# Patient Record
Sex: MALE | Race: OTHER | Employment: FULL TIME | URBAN - METROPOLITAN AREA
[De-identification: names, ages, dates, MRNs, and addresses within clinical notes are randomized per-mention and may not be internally consistent; named-entity substitution may affect disease eponyms.]

---

## 2019-04-17 ENCOUNTER — APPOINTMENT (OUTPATIENT)
Dept: GENERAL RADIOLOGY | Age: 19
End: 2019-04-17
Payer: COMMERCIAL

## 2019-04-17 ENCOUNTER — HOSPITAL ENCOUNTER (EMERGENCY)
Age: 19
Discharge: HOME | End: 2019-04-17
Attending: EMERGENCY MEDICINE
Payer: COMMERCIAL

## 2019-04-17 VITALS
WEIGHT: 160 LBS | SYSTOLIC BLOOD PRESSURE: 136 MMHG | DIASTOLIC BLOOD PRESSURE: 73 MMHG | TEMPERATURE: 99.7 F | OXYGEN SATURATION: 96 % | HEART RATE: 85 BPM | RESPIRATION RATE: 16 BRPM | BODY MASS INDEX: 23.7 KG/M2 | HEIGHT: 69 IN

## 2019-04-17 DIAGNOSIS — R52 BODY ACHES: ICD-10-CM

## 2019-04-17 DIAGNOSIS — R11.2 NON-INTRACTABLE VOMITING WITH NAUSEA, UNSPECIFIED VOMITING TYPE: Primary | ICD-10-CM

## 2019-04-17 LAB
A/G RATIO: 1.3 (ref 1.1–2.2)
ALBUMIN SERPL-MCNC: 4.2 G/DL (ref 3.4–5)
ALP BLD-CCNC: 91 U/L (ref 40–129)
ALT SERPL-CCNC: 11 U/L (ref 10–40)
ANION GAP SERPL CALCULATED.3IONS-SCNC: 14 MMOL/L (ref 3–16)
AST SERPL-CCNC: 16 U/L (ref 15–37)
BASOPHILS ABSOLUTE: 0 K/UL (ref 0–0.2)
BASOPHILS RELATIVE PERCENT: 0.2 %
BILIRUB SERPL-MCNC: 0.5 MG/DL (ref 0–1)
BUN BLDV-MCNC: 11 MG/DL (ref 7–20)
CALCIUM SERPL-MCNC: 9 MG/DL (ref 8.3–10.6)
CHLORIDE BLD-SCNC: 97 MMOL/L (ref 99–110)
CO2: 24 MMOL/L (ref 21–32)
CREAT SERPL-MCNC: 1.1 MG/DL (ref 0.9–1.3)
EOSINOPHILS ABSOLUTE: 0 K/UL (ref 0–0.6)
EOSINOPHILS RELATIVE PERCENT: 0.2 %
GFR AFRICAN AMERICAN: >60
GFR NON-AFRICAN AMERICAN: >60
GLOBULIN: 3.2 G/DL
GLUCOSE BLD-MCNC: 100 MG/DL (ref 70–99)
HCT VFR BLD CALC: 42 % (ref 40.5–52.5)
HEMOGLOBIN: 14 G/DL (ref 13.5–17.5)
LIPASE: 13 U/L (ref 13–60)
LYMPHOCYTES ABSOLUTE: 1.1 K/UL (ref 1–5.1)
LYMPHOCYTES RELATIVE PERCENT: 14.8 %
MCH RBC QN AUTO: 28.2 PG (ref 26–34)
MCHC RBC AUTO-ENTMCNC: 33.3 G/DL (ref 31–36)
MCV RBC AUTO: 84.6 FL (ref 80–100)
MONOCYTES ABSOLUTE: 0.8 K/UL (ref 0–1.3)
MONOCYTES RELATIVE PERCENT: 10.1 %
NEUTROPHILS ABSOLUTE: 5.7 K/UL (ref 1.7–7.7)
NEUTROPHILS RELATIVE PERCENT: 74.7 %
PDW BLD-RTO: 13.4 % (ref 12.4–15.4)
PLATELET # BLD: 191 K/UL (ref 135–450)
PMV BLD AUTO: 10.1 FL (ref 5–10.5)
POTASSIUM SERPL-SCNC: 3.3 MMOL/L (ref 3.5–5.1)
RBC # BLD: 4.96 M/UL (ref 4.2–5.9)
SODIUM BLD-SCNC: 135 MMOL/L (ref 136–145)
TOTAL PROTEIN: 7.4 G/DL (ref 6.4–8.2)
WBC # BLD: 7.7 K/UL (ref 4–11)

## 2019-04-17 PROCEDURE — 99284 EMERGENCY DEPT VISIT MOD MDM: CPT

## 2019-04-17 PROCEDURE — 96374 THER/PROPH/DIAG INJ IV PUSH: CPT

## 2019-04-17 PROCEDURE — 85025 COMPLETE CBC W/AUTO DIFF WBC: CPT

## 2019-04-17 PROCEDURE — 2580000003 HC RX 258: Performed by: EMERGENCY MEDICINE

## 2019-04-17 PROCEDURE — 6360000002 HC RX W HCPCS: Performed by: EMERGENCY MEDICINE

## 2019-04-17 PROCEDURE — 71045 X-RAY EXAM CHEST 1 VIEW: CPT

## 2019-04-17 PROCEDURE — 80053 COMPREHEN METABOLIC PANEL: CPT

## 2019-04-17 PROCEDURE — 6370000000 HC RX 637 (ALT 250 FOR IP): Performed by: EMERGENCY MEDICINE

## 2019-04-17 PROCEDURE — 83690 ASSAY OF LIPASE: CPT

## 2019-04-17 RX ORDER — FAMOTIDINE 20 MG/1
20 TABLET, FILM COATED ORAL 2 TIMES DAILY
Qty: 10 TABLET | Refills: 0 | Status: SHIPPED | OUTPATIENT
Start: 2019-04-17 | End: 2019-04-22

## 2019-04-17 RX ORDER — ONDANSETRON 4 MG/1
4 TABLET, ORALLY DISINTEGRATING ORAL EVERY 4 HOURS PRN
Qty: 10 TABLET | Refills: 0 | Status: SHIPPED | OUTPATIENT
Start: 2019-04-17

## 2019-04-17 RX ORDER — ONDANSETRON 2 MG/ML
4 INJECTION INTRAMUSCULAR; INTRAVENOUS ONCE
Status: COMPLETED | OUTPATIENT
Start: 2019-04-17 | End: 2019-04-17

## 2019-04-17 RX ORDER — FAMOTIDINE 20 MG/1
20 TABLET, FILM COATED ORAL ONCE
Status: COMPLETED | OUTPATIENT
Start: 2019-04-17 | End: 2019-04-17

## 2019-04-17 RX ORDER — 0.9 % SODIUM CHLORIDE 0.9 %
1000 INTRAVENOUS SOLUTION INTRAVENOUS ONCE
Status: COMPLETED | OUTPATIENT
Start: 2019-04-17 | End: 2019-04-17

## 2019-04-17 RX ADMIN — ONDANSETRON 4 MG: 2 INJECTION INTRAMUSCULAR; INTRAVENOUS at 06:16

## 2019-04-17 RX ADMIN — FAMOTIDINE 20 MG: 20 TABLET ORAL at 06:47

## 2019-04-17 RX ADMIN — SODIUM CHLORIDE 1000 ML: 9 INJECTION, SOLUTION INTRAVENOUS at 06:14

## 2019-04-17 ASSESSMENT — PAIN DESCRIPTION - LOCATION: LOCATION: GENERALIZED

## 2019-04-17 ASSESSMENT — PAIN DESCRIPTION - PAIN TYPE: TYPE: ACUTE PAIN

## 2019-04-17 ASSESSMENT — PAIN DESCRIPTION - DESCRIPTORS: DESCRIPTORS: ACHING

## 2019-04-17 NOTE — LETTER
[facility] Emergency Department  [address] [place] [address], [address]             April 17, 2019    Patient: Keshawn Hickey   YOB: 2000   Date of Visit: 4/17/2019       To Whom It May Concern:    Janna Zhu was seen and treated in our emergency department on 4/17/2019. He may return to work on 4/18/19.       Sincerely,         Alessandro COSTA, RN

## 2019-04-17 NOTE — ED NOTES
Discharge instructions discussed with no questions or concerns. Pt. Jose Carlos Mares understanding to follow up with [place] clinic. Pt. Ambulatory upon discharge with no signs of distress noted.        Melody Rios RN  04/17/19 8315

## 2019-04-17 NOTE — ED NOTES
Pt presents with vomiting and generalized body aches and a headache x 1 day. Pt denies any dizziness or blurred vision. Respirations even and unlabored. No emesis noted at present. Call bell in reach.           Prince Guillory RN  04/17/19 7258

## 2019-04-17 NOTE — ED NOTES
Saline lock inserted with blood drawn and sent to lab. NS infusing IV with zofran given IV. No vomiting noted. Call bell in reach. Comfort measures maintained and pt resting with lights dimmed in room.      Yosi Ramirez RN  04/17/19 3121

## 2019-04-17 NOTE — ED PROVIDER NOTES
[facility]  [facility]      Pt Name: Aleyda Polk  MRN: [id number]  [place] 2000  Date ofevaluation: 4/17/2019  Provider: Wilhelminia Lefort, [address] [place] [address]       Chief Complaint   Patient presents with    Emesis     with generalized bodyaches and headache x 1 day         HISTORY OF PRESENT ILLNESS   (Location/Symptom, Timing/Onset,Context/Setting, Quality, Duration, Modifying Factors, Severity)  Note limiting factors. HPI    Aleyda Polk is a 22 y.o. male who presents to the emergency department with a chief complaint of generalized body aches and a headache that started yesterday morning. It has been constantly present since. he's had associated nausea ,vomiting once. no diarrhea. has some generalized cramping abdominal pain and feeling of cramping into his chest.  denies any sort shortness of breath. no runny nose ,sore throat or cough. Denies headache at this time         Nursing Notes were reviewed. REVIEW OF SYSTEMS    (2-9 systems for level 4, 10 or more for level 5)     Review of Systems  General:Denies fever, headache  ENT: no runny nose, sore throat   Respiratory:no cough, chest congestion or shortness of breath  Cardiovascular: no chest pain or palpitations  GI: no constipation or diarrhea  Neurological:  No weakness, numbness no speech or memory problems. Remainder of systems reviewed and negative. Nursing notes reviewed and I agree, except as otherwise noted. vitals signs reviewed. Allergies, Past Medical and Surgical History reviewed. Social andFamily History reviewed. and I agree, except as otherwise noted       Except as noted above the remainder of the review of systems was reviewed and negative. PAST MEDICAL HISTORY   History reviewed. No pertinent past medical history.       SURGICAL HISTORY       Past Surgical History:   Procedure Laterality Date    ADENOIDECTOMY      TONSILLECTOMY      TYMPANOSTOMY TUBE PLACEMENT           CURRENT MEDICATIONS       Previous Medications    No medications on file       ALLERGIES     Patient has no known allergies. FAMILY HISTORY     History reviewed. No pertinent family history. SOCIAL HISTORY       Social History     Socioeconomic History    Marital status: Single     Spouse name: None    Number of children: None    Years of education: None    Highest education level: None   Occupational History    None   Social Needs    Financial resource strain: None    Food insecurity:     Worry: None     Inability: None    Transportation needs:     Medical: None     Non-medical: None   Tobacco Use    Smoking status: Never Smoker   Substance and Sexual Activity    Alcohol use: No    Drug use: No    Sexual activity: None   Lifestyle    Physical activity:     Days per week: None     Minutes per session: None    Stress: None   Relationships    Social connections:     Talks on phone: None     Gets together: None     Attends [employer] service: None     Active member of club or organization: None     Attends meetings of clubs or organizations: None     Relationship status: None    Intimate partner violence:     Fear of current or ex partner: None     Emotionally abused: None     Physically abused: None     Forced sexual activity: None   Other Topics Concern    None   Social History Narrative    None       SCREENINGS             PHYSICAL EXAM    (up to 7 for level 4, 8 or more for level 5)     ED Triage Vitals [04/17/19 0511]   BP Temp Temp Source Heart Rate Resp SpO2 Height Weight   133/74 99 °F (37.2 °C) Oral 90 16 99 % 5' 9\" (1.753 m) 160 lb (72.6 kg)       Physical Exam  GENERAL: Patient appeared well-developed, well-nourished, vital signs reviewed. MENTAL STATUS: Patient is awake and alert   HEAD AND FACE :Head is normal cephalic. Face normal appearance  EYES: eyes lids and conjunctiva appear normal. Pupils reactive  ENT :ears and nose appear normal externally.   TMs intact without erythema. Nose is a reddened. Nasal mucosa throat has normal appearing pharyngeal mucosa no tonsillar enlargement or exudates  CV: Heart regular rate and rhythm without murmur,  LUNGS: Lungs are clear to auscultation without wheezing, rales, or rhonchi. Normal respiratory effort. CHEST WALL: normal appearance. normal motion  ABDOMEN: Abdomen is soft to palpation. Mild diffuse tenderness to palpation. No guarding rigidity or rebound . Bowel sounds are present in all 4 quadrants No masses palpable. No CV tenderness present. No Bruits present. EXTREMITIES: Extremities moves all 4 Extremities without any weakness  There is no calf tenderness or lower extremity edema  PSYCHIATRIC:mood and affect normal    NEUROLOGIC: no weakness or numbness noted,  no meningeal signs      This is a computerized dictation. I have made every effort to proofread this chart, however, transcription errors may exist.     DIAGNOSTIC RESULTS         Interpretation per the Radiologist below, if available at the time of this note:    XR CHEST PORTABLE    (Results Pending)           LABS:  Labs Reviewed   COMPREHENSIVE METABOLIC PANEL   LIPASE   CBC WITH AUTO DIFFERENTIAL       All other labs were within normal range or not returned as of this dictation. EMERGENCY DEPARTMENT COURSE and DIFFERENTIAL DIAGNOSIS/MDM:   Vitals:    Vitals:    04/17/19 0511   BP: 133/74   Pulse: 90   Resp: 16   Temp: 99 °F (37.2 °C)   TempSrc: Oral   SpO2: 99%   Weight: 160 lb (72.6 kg)   Height: 5' 9\" (1.753 m)           MDM    Patient presents with nausea vomiting and generalized body aches finding consistent with a stomach virus. I see nothing that would suggest an acute abdomen , bowel obstruction, acute pancreatitis, abscess, perforated viscous, diverticulitis, cholecystis, appendicitis . I feel the patient can be managed as an outpatient with follow up.   Instructions have been given for the patient to return to the ED for worsening

## 2019-04-17 NOTE — ED NOTES
Pepcid ordered for the burning sensation in chest. Dr. Yomi Garner at bedside talking with pt     Kristi Shearer, AURELIO  04/17/19 0523

## 2021-09-27 ENCOUNTER — APPOINTMENT (OUTPATIENT)
Dept: GENERAL RADIOLOGY | Age: 21
End: 2021-09-27
Payer: COMMERCIAL

## 2021-09-27 ENCOUNTER — HOSPITAL ENCOUNTER (EMERGENCY)
Age: 21
Discharge: HOME | End: 2021-09-27
Payer: COMMERCIAL

## 2021-09-27 VITALS
WEIGHT: 207 LBS | HEART RATE: 99 BPM | TEMPERATURE: 98.6 F | RESPIRATION RATE: 18 BRPM | OXYGEN SATURATION: 99 % | DIASTOLIC BLOOD PRESSURE: 58 MMHG | BODY MASS INDEX: 30.57 KG/M2 | SYSTOLIC BLOOD PRESSURE: 127 MMHG

## 2021-09-27 DIAGNOSIS — J06.9 URI WITH COUGH AND CONGESTION: Primary | ICD-10-CM

## 2021-09-27 DIAGNOSIS — Z11.52 ENCOUNTER FOR SCREENING FOR COVID-19: ICD-10-CM

## 2021-09-27 PROCEDURE — U0005 INFEC AGEN DETEC AMPLI PROBE: HCPCS

## 2021-09-27 PROCEDURE — U0003 INFECTIOUS AGENT DETECTION BY NUCLEIC ACID (DNA OR RNA); SEVERE ACUTE RESPIRATORY SYNDROME CORONAVIRUS 2 (SARS-COV-2) (CORONAVIRUS DISEASE [COVID-19]), AMPLIFIED PROBE TECHNIQUE, MAKING USE OF HIGH THROUGHPUT TECHNOLOGIES AS DESCRIBED BY CMS-2020-01-R: HCPCS

## 2021-09-27 PROCEDURE — 71045 X-RAY EXAM CHEST 1 VIEW: CPT

## 2021-09-27 PROCEDURE — 99283 EMERGENCY DEPT VISIT LOW MDM: CPT

## 2021-09-27 RX ORDER — CETIRIZINE HYDROCHLORIDE 10 MG/1
10 TABLET ORAL DAILY
Qty: 30 TABLET | Refills: 0 | Status: SHIPPED | OUTPATIENT
Start: 2021-09-27 | End: 2021-10-27

## 2021-09-27 RX ORDER — FLUTICASONE PROPIONATE 50 MCG
1 SPRAY, SUSPENSION (ML) NASAL DAILY
Qty: 16 G | Refills: 0 | Status: SHIPPED | OUTPATIENT
Start: 2021-09-27

## 2021-09-27 RX ORDER — GUAIFENESIN/DEXTROMETHORPHAN 100-10MG/5
5 SYRUP ORAL 3 TIMES DAILY PRN
Qty: 120 ML | Refills: 0 | Status: SHIPPED | OUTPATIENT
Start: 2021-09-27 | End: 2021-10-07

## 2021-09-27 ASSESSMENT — ENCOUNTER SYMPTOMS
COUGH: 1
TROUBLE SWALLOWING: 0
EYE ITCHING: 0
SORE THROAT: 1
CHEST TIGHTNESS: 0
SINUS PRESSURE: 0
VOMITING: 0
BACK PAIN: 0
VOICE CHANGE: 0
CONSTIPATION: 0
EYE REDNESS: 0
COLOR CHANGE: 0
DIARRHEA: 0
NAUSEA: 1
EYE DISCHARGE: 0
RHINORRHEA: 1
SINUS PAIN: 0
EYE PAIN: 0
SHORTNESS OF BREATH: 0
ABDOMINAL PAIN: 0

## 2021-09-27 ASSESSMENT — PAIN SCALES - GENERAL: PAINLEVEL_OUTOF10: 4

## 2021-09-27 NOTE — ED PROVIDER NOTES
[facility]        Pt Name: Luzmaria Corrigan  MRN: [id number]  [place] 2000  Date of evaluation: 9/27/2021  Provider: Wyvonna Kocher, [place]  PCP: [facility]  Note Started: 10:51 AM EDT       DENZEL. I have evaluated this patient. My supervising physician was available for consultation. CHIEF COMPLAINT       Chief Complaint   Patient presents with    Illness     Headache, body aches, cough for approx one week. [place] exposure; not vaccinated. Concerned for more freq illness over the past five years; does not have a PCP/        HISTORY OF PRESENT ILLNESS   (Location, Timing/Onset, Context/Setting, Quality, Duration, Modifying Factors, Severity, Associated Signs and Symptoms)  Note limiting factors. Chief Complaint: URI symptoms    Luzmaria Corrigan is a 24 y.o. male with no significant past medical history who presents to the ED with complaint of upper respiratory symptoms. States for the past week he has had nonproductive cough, myalgias, arthralgias, frontal headache, sinus congestion, rhinorrhea, chest congestion and sore throat. Patient denies any sick contacts or recent travel. Denies any known exposure to COVID-19/coronavirus. Patient states he has not been vaccinated against COVID-19. Patient denies any drooling, trismus, stridor or respiratory stress. Denies shortness of breath, chest pain, pleuritic pain, orthopnea, pedal edema or calf tenderness. Patient states he has had some nausea but denies any vomiting or abdominal pain. Denies loss of taste or smell. Denies decreased oral intake. Denies urinary symptoms or changes in bowel movements. Patient denies any headache currently. Denies any ear pain or pressure. Patient denies any known fever or chills. Denies any rashes or lesions. Became concerned and came to the ED for further evaluation and treatment.   Denies taking any over-the-counter medication Procedure Laterality Date    ADENOIDECTOMY      TONSILLECTOMY      TYMPANOSTOMY TUBE PLACEMENT           CURRENTMEDICATIONS       Previous Medications    FAMOTIDINE (PEPCID) 20 MG TABLET    Take 1 tablet by mouth 2 times daily for 5 days    ONDANSETRON (ZOFRAN ODT) 4 MG DISINTEGRATING TABLET    Take 1 tablet by mouth every 4 hours as needed for Nausea         ALLERGIES     Patient has no known allergies. FAMILYHISTORY     History reviewed. No pertinent family history. SOCIAL HISTORY       Social History     Tobacco Use    Smoking status: Never Smoker   Substance Use Topics    Alcohol use: No    Drug use: No       SCREENINGS             PHYSICAL EXAM    (up to 7 for level 4, 8 or more for level 5)     ED Triage Vitals   BP Temp Temp src Pulse Resp SpO2 Height Weight   09/27/21 0914 09/27/21 0919 -- 09/27/21 0914 09/27/21 0914 09/27/21 0914 -- 09/27/21 0914   (!) 127/58 98.6 °F (37 °C)  99 18 99 %  207 lb (93.9 kg)       Physical Exam  Constitutional:       General: He is not in acute distress. Appearance: Normal appearance. He is well-developed. He is not ill-appearing, toxic-appearing or diaphoretic. HENT:      Head: Normocephalic and atraumatic. Right Ear: Ear canal and external ear normal. There is no impacted cerumen. Left Ear: Ear canal and external ear normal. There is no impacted cerumen. Ears:      Comments: Patient has what appears to be middle ear effusions bilaterally. There is no TM erythema, bulging or retraction. Canals unremarkable. No mastoid tenderness. No tragal tenderness. No movement pain. Nose: Congestion present. No rhinorrhea. Comments: Mucosal edema to the bilateral nares. No TTP to the frontal or maxillary sinuses. Mouth/Throat:      Mouth: Mucous membranes are moist.      Pharynx: No oropharyngeal exudate or posterior oropharyngeal erythema. Eyes:      General:         Right eye: No discharge. Left eye: No discharge. Conjunctiva/sclera: Conjunctivae normal.   Cardiovascular:      Rate and Rhythm: Normal rate and regular rhythm. Pulses: Normal pulses. Heart sounds: Normal heart sounds. No murmur heard. No friction rub. No gallop. Comments: 2+ radial pulses bilaterally. No pedal edema. No calf tenderness. No JVD. Pulmonary:      Effort: Pulmonary effort is normal. No respiratory distress. Breath sounds: Normal breath sounds. No stridor. No wheezing, rhonchi or rales. Chest:      Chest wall: No tenderness. Abdominal:      General: Abdomen is flat. Bowel sounds are normal. There is no distension. Palpations: Abdomen is soft. There is no mass. Tenderness: There is no abdominal tenderness. There is no right CVA tenderness, left CVA tenderness, guarding or rebound. Hernia: No hernia is present. Musculoskeletal:         General: Normal range of motion. Cervical back: Normal range of motion and neck supple. No rigidity or tenderness. Lymphadenopathy:      Cervical: No cervical adenopathy. Skin:     General: Skin is warm and dry. Coloration: Skin is not pale. Findings: No erythema or rash. Neurological:      Mental Status: He is alert and oriented to person, place, and time. Psychiatric:         Behavior: Behavior normal.         DIAGNOSTIC RESULTS   LABS:    Labs Reviewed   [id number]       When ordered only abnormal lab results are displayed. All other labs were within normal range or not returned as of this dictation. EKG: When ordered, EKG's are interpreted by the Emergency Department Physician in the absence of a cardiologist.  Please see their note for interpretation of EKG.     RADIOLOGY:   Non-plain film images such as CT, Ultrasound and MRI are read by the radiologist. Plain radiographic images are visualized and preliminarily interpreted by the ED Provider with the below findings:        Interpretation per the Radiologist below, if available at the time of this note:    XR CHEST PORTABLE   Final Result   No evidence of acute cardiopulmonary disease. XR CHEST PORTABLE    Result Date: 9/27/2021  EXAMINATION: ONE XRAY VIEW OF THE CHEST 9/27/2021 9:51 am COMPARISON: April 17, 2019 HISTORY: ORDERING SYSTEM PROVIDED HISTORY: cough TECHNOLOGIST PROVIDED HISTORY: Reason for exam:->cough Reason for Exam: cough Acuity: Acute Type of Exam: Initial FINDINGS: No evidence of pneumonia, edema or other acute pulmonary process. No evidence of acute process of the cardiac or mediastinal structures. No evidence of pneumothorax or pleural effusion. No evidence of acute cardiopulmonary disease. PROCEDURES   Unless otherwise noted below, none     Procedures    CRITICAL CARE TIME   N/A    CONSULTS:  None      EMERGENCY DEPARTMENT COURSE and DIFFERENTIAL DIAGNOSIS/MDM:   Vitals:    Vitals:    09/27/21 0914 09/27/21 0919   BP: (!) 127/58    Pulse: 99    Resp: 18    Temp:  98.6 °F (37 °C)   SpO2: 99%    Weight: 207 lb (93.9 kg)        Patient was given the following medications:  Medications - No data to display        Patient is a 58-year-old male who presents to the ED with complaint of a URI symptoms. Patient complained of upper respiratory symptoms but ongoing for the past week. Covid swab obtained. Concern for potential sinusitis versus viral etiology this time. Will need to self quarantine pending Covid results. Instructed may take up to 24 to 48 hours to result. Chest x-ray showed no acute abnormality. Do not believe empiric antibiotics indicated. We will give Zyrtec, Flonase and guaifenesin for home. Referral to PCP. Return if any worsening symptoms. Did discuss with patient that he may need some routine blood work drawn but that should be performed by PCP. No emergent occasion for blood work at this time. Patient agreeable with outpatient follow-up by PCP.   Low suspicion for pneumonia, otitis media, otitis externa, mastoiditis, bacterial sinusitis, strep pharyngitis, PTA, retropharyngeal abscess, epiglottitis, bacterial tracheitis, respiratory distress, surgical abdomen, meningitis, sepsis or other emergent etiology at this time. FINAL IMPRESSION      1. URI with cough and congestion    2. Encounter for screening for COVID-19          DISPOSITION/PLAN   DISPOSITION Decision To Discharge 09/27/2021 10:49:22 AM      PATIENT REFERRED TO:  [facility]    Schedule an appointment as soon as possible for a visit   For a Re-check in    5-7  days.     [facility] Emergency Department  [address] [facility]  [phone]  Go to   As needed, If symptoms worsen    [facility]) Pre-Services  [phone]          DISCHARGE MEDICATIONS:  New Prescriptions    CETIRIZINE (ZYRTEC) 10 MG TABLET    Take 1 tablet by mouth daily    FLUTICASONE (FLONASE) 50 MCG/ACT NASAL SPRAY    1 spray by Each Nostril route daily    GUAIFENESIN-DEXTROMETHORPHAN (ROBITUSSIN DM) 100-10 MG/5ML SYRUP    Take 5 mLs by mouth 3 times daily as needed for Cough       DISCONTINUED MEDICATIONS:  Discontinued Medications    No medications on file              (Please note that portions of this note were completed with a voice recognition program.  Efforts were made to edit the dictations but occasionally words are mis-transcribed.)    JOSE DANIEL Viera (electronically signed)          JOSE DANIEL Martinez  09/27/21 0625

## 2021-09-28 ENCOUNTER — CARE COORDINATION (OUTPATIENT)
Dept: CARE COORDINATION | Age: 21
End: 2021-09-28

## 2021-09-28 LAB — SARS-COV-2: DETECTED

## 2021-09-28 NOTE — CARE COORDINATION
Patient contacted regarding COVID-19 diagnosis. Discussed COVID-19 related testing which was available at this time. Test results were positive. Patient informed of results, if available? Yes. Ambulatory Care Manager contacted the patient by telephone to perform post discharge assessment. Call within 2 business days of discharge: Yes. Verified name and  with patient as identifiers. Provided introduction to self, and explanation of the CTN/ACM role, and reason for call due to risk factors for infection and/or exposure to COVID-19. Symptoms reviewed with patient who verbalized the following symptoms: pain or aching joints, cough and headache. .      Due to no new or worsening symptoms encounter was not routed to provider for escalation. Discussed follow-up appointments. If no appointment was previously scheduled, appointment scheduling offered: Yes. [address] follow up appointment(s): No future appointments. Non-[facility] follow up appointment(s): none    Non-face-to-face services provided:  Obtained and reviewed discharge summary and/or continuity of care documents  Education of patient/family/caregiver/guardian to support self-management-for COVID 19. Advance Care Planning:   Does patient have an Advance Directive:  not on file; education provided. Educated patient about risk for severe COVID-19 due to risk factors according to CDC guidelines. ACM reviewed discharge instructions, medical action plan and red flag symptoms with the patient who verbalized understanding. Discussed COVID vaccination status: Yes. Education provided on COVID-19 vaccination as appropriate. Discussed exposure protocols and quarantine with CDC Guidelines. Patient was given an opportunity to verbalize any questions and concerns and agrees to contact ACM or health care provider for questions related to their healthcare.     Reviewed and educated patient on any new and changed medications related to discharge diagnosis     Was patient discharged with a pulse oximeter? No Discussed and confirmed pulse oximeter discharge instructions and when to notify provider or seek emergency care. ACM provided contact information. Plan for follow-up call in 1-2 days based on severity of symptoms and risk factors.

## 2022-12-19 ENCOUNTER — HOSPITAL ENCOUNTER (EMERGENCY)
Age: 22
Discharge: HOME | End: 2022-12-19

## 2022-12-19 VITALS
DIASTOLIC BLOOD PRESSURE: 69 MMHG | OXYGEN SATURATION: 98 % | TEMPERATURE: 99 F | WEIGHT: 204 LBS | HEART RATE: 86 BPM | BODY MASS INDEX: 30.13 KG/M2 | SYSTOLIC BLOOD PRESSURE: 124 MMHG | RESPIRATION RATE: 18 BRPM

## 2022-12-19 DIAGNOSIS — U07.1 COVID-19: Primary | ICD-10-CM

## 2022-12-19 LAB
RAPID INFLUENZA  B AGN: NEGATIVE
RAPID INFLUENZA A AGN: NEGATIVE
SARS-COV-2, NAAT: DETECTED

## 2022-12-19 PROCEDURE — 87804 INFLUENZA ASSAY W/OPTIC: CPT

## 2022-12-19 PROCEDURE — 87635 SARS-COV-2 COVID-19 AMP PRB: CPT

## 2022-12-19 PROCEDURE — 99283 EMERGENCY DEPT VISIT LOW MDM: CPT

## 2022-12-19 RX ORDER — BENZONATATE 100 MG/1
100 CAPSULE ORAL 3 TIMES DAILY PRN
Qty: 20 CAPSULE | Refills: 0 | Status: SHIPPED | OUTPATIENT
Start: 2022-12-19 | End: 2022-12-26

## 2022-12-19 RX ORDER — FAMOTIDINE 20 MG/1
20 TABLET, FILM COATED ORAL 2 TIMES DAILY
Qty: 10 TABLET | Refills: 0 | Status: SHIPPED | OUTPATIENT
Start: 2022-12-19 | End: 2022-12-24

## 2022-12-19 ASSESSMENT — ENCOUNTER SYMPTOMS
SORE THROAT: 0
NAUSEA: 0
DIARRHEA: 0
ABDOMINAL PAIN: 0
VOMITING: 0
CONSTIPATION: 0
SHORTNESS OF BREATH: 0
WHEEZING: 0
COUGH: 1
EYE PAIN: 0
RHINORRHEA: 0
BACK PAIN: 0

## 2022-12-19 ASSESSMENT — PAIN - FUNCTIONAL ASSESSMENT: PAIN_FUNCTIONAL_ASSESSMENT: NONE - DENIES PAIN

## 2022-12-19 NOTE — ED PROVIDER NOTES
[address] [facility]        Pt Name: Marnie Francis  MRN: [id number]  [place] 2000  Date of evaluation: 12/19/2022  Provider: JOSE DANIEL Watt  PCP: No primary care provider on file. Note Started: 10:29 AM EST 12/19/22          DENZEL. I have evaluated this patient. My supervising physician was available for consultation. CHIEF COMPLAINT       Chief Complaint   Patient presents with    Illness     Headache and chills for approx one week; increasingly concerned for n/v & productive cough that began last night. HISTORY OF PRESENT ILLNESS   (Location, Timing/Onset, Context/Setting, Quality, Duration, Modifying Factors, Severity, Associated Signs and Symptoms)  Note limiting factors. Chief Complaint: Cough    Marnie Francis is a 20 y.o. male who presents cough for 1 week. Patient also states he has been experiencing some headaches fatigue fever and posttussive vomiting since symptoms began a week ago. Patient states that he has been exposed to people at his work with COVID-19. Patient states he has been taking Mucinex but no other medications to help with the symptoms. Patient denies any sore throat, chest pain or shortness of breath. Patient states that his fevers have been subjective fevers and never took his temperature. Patient has any lower leg swelling or calf pain. Nursing Notes were all reviewed and agreed with or any disagreements were addressed in the HPI. REVIEW OF SYSTEMS    (2-9 systems for level 4, 10 or more for level 5)     Review of Systems   Constitutional:  Positive for chills, fatigue and fever. Negative for diaphoresis. HENT:  Negative for congestion, rhinorrhea and sore throat. Eyes:  Negative for pain and visual disturbance. Respiratory:  Positive for cough. Negative for shortness of breath and wheezing. Cardiovascular:  Negative for chest pain and leg swelling.    Gastrointestinal: Negative for abdominal pain, constipation, diarrhea, nausea and vomiting. Genitourinary:  Negative for difficulty urinating, dysuria and frequency. Musculoskeletal:  Negative for back pain and neck pain. Skin:  Negative for rash and wound. Neurological:  Positive for headaches. Negative for dizziness and light-headedness. Positives and Pertinent negatives as per HPI. Except as noted above in the ROS, all other systems were reviewed and negative. PAST MEDICAL HISTORY   History reviewed. No pertinent past medical history. SURGICAL HISTORY     Past Surgical History:   Procedure Laterality Date    ADENOIDECTOMY      TONSILLECTOMY      TYMPANOSTOMY TUBE PLACEMENT           CURRENTMEDICATIONS       Previous Medications    FLUTICASONE (FLONASE) 50 MCG/ACT NASAL SPRAY    1 spray by Each Nostril route daily    ONDANSETRON (ZOFRAN ODT) 4 MG DISINTEGRATING TABLET    Take 1 tablet by mouth every 4 hours as needed for Nausea         ALLERGIES     Patient has no known allergies. FAMILYHISTORY     History reviewed. No pertinent family history. SOCIAL HISTORY       Social History     Tobacco Use    Smoking status: Never   Substance Use Topics    Alcohol use: No    Drug use: No       SCREENINGS                         CIWA Assessment  BP: 124/69  Heart Rate: 86             PHYSICAL EXAM    (up to 7 for level 4, 8 or more for level 5)     ED Triage Vitals [12/19/22 1019]   BP Temp Temp src Heart Rate Resp SpO2 Height Weight   124/69 99 °F (37.2 °C) -- 86 18 98 % -- 204 lb (92.5 kg)       Physical Exam  Vitals and nursing note reviewed. Constitutional:       General: He is not in acute distress. Appearance: Normal appearance. He is not ill-appearing. HENT:      Head: Normocephalic. Right Ear: Tympanic membrane, ear canal and external ear normal. There is no impacted cerumen. Left Ear: Tympanic membrane, ear canal and external ear normal. There is no impacted cerumen.       Nose: Nose normal. No congestion or rhinorrhea. Mouth/Throat:      Mouth: Mucous membranes are moist.      Pharynx: Oropharynx is clear. Uvula midline. Posterior oropharyngeal erythema present. No oropharyngeal exudate. Tonsils: Tonsillar exudate present. 1+ on the right. 1+ on the left. Eyes:      General:         Right eye: No discharge. Left eye: No discharge. Conjunctiva/sclera: Conjunctivae normal.      Pupils: Pupils are equal, round, and reactive to light. Cardiovascular:      Rate and Rhythm: Normal rate and regular rhythm. Pulses: Normal pulses. Heart sounds: Normal heart sounds. Pulmonary:      Effort: Pulmonary effort is normal.      Breath sounds: Normal breath sounds. Musculoskeletal:      Cervical back: Normal range of motion and neck supple. Right lower leg: No edema. Left lower leg: No edema. Neurological:      Mental Status: He is alert and oriented to person, place, and time. Psychiatric:         Mood and Affect: Mood normal.         Behavior: Behavior normal.       DIAGNOSTIC RESULTS   LABS:    Labs Reviewed   COVID-19, RAPID - Abnormal; Notable for the following components:       Result Value    SARS-CoV-2, NAAT DETECTED (*)     All other components within normal limits   RAPID INFLUENZA A/B ANTIGENS       When ordered only abnormal lab results are displayed. All other labs were within normal range or not returned as of this dictation. EKG: When ordered, EKG's are interpreted by the Emergency Department Physician in the absence of a cardiologist.  Please see their note for interpretation of EKG. RADIOLOGY:   Non-plain film images such as CT, Ultrasound and MRI are read by the radiologist. Plain radiographic images are visualized and preliminarily interpreted by the ED Provider with the below findings:        Interpretation per the Radiologist below, if available at the time of this note:    No orders to display     No results found.     No results found.    PROCEDURES   Unless otherwise noted below, none     Procedures    CRITICAL CARE TIME       CONSULTS:  None      EMERGENCY DEPARTMENT COURSE and DIFFERENTIAL DIAGNOSIS/MDM:   Vitals:    Vitals:    12/19/22 1019   BP: 124/69   Pulse: 86   Resp: 18   Temp: 99 °F (37.2 °C)   SpO2: 98%   Weight: 204 lb (92.5 kg)       Patient was given the following medications:  Medications - No data to display      Is this patient to be included in the SEP-1 Core Measure due to severe sepsis or septic shock? No   Exclusion criteria - the patient is NOT to be included for SEP-1 Core Measure due to:  Viral etiology found or highly suspected (including COVID-19) without concomitant bacterial infection    23-year-old male presents emergency room due to cough, headaches, body aches, fevers, chills for the past week. On exam patient otherwise appears well in no acute distress. Patient has a pulse ox saturation of 98% on room air with a heart rate of 86 bpm.  Patient able to speak with the in full sentences without any shortness of breath or difficulty breathing he denies any chest pain or shortness of breath. Lungs are clear to auscultation bilaterally do not feel that chest x-ray is needed at this time. Patient is afebrile with a temperature of 99 °F here in the emergency department. Influenza and COVID-19 testing were pursued and COVID-19 testing was positive. Patient has a reassuring exam and I do not feel that there is any chance for respiratory decompensation over the last 24 to 48 hours and for this reason the patient can be discharged and treated as an outpatient. Patient will be given cough medication and to follow-up with his primary care doctor next week for recheck. At this time I have low suspicion for pneumonia, pertussis, hemothorax, pneumothorax, pericarditis, myocarditis, tension pneumothorax, influenza,  pulmonary embolism, Aortic dissection, AAA or other acute emergency pathologies.       FINAL IMPRESSION 1. COVID-19          DISPOSITION/PLAN   DISPOSITION Decision To Discharge 12/19/2022 11:21:02 AM      PATIENT REFERRED TO:  No follow-up provider specified.     DISCHARGE MEDICATIONS:  New Prescriptions    BENZONATATE (TESSALON) 100 MG CAPSULE    Take 1 capsule by mouth 3 times daily as needed for Cough       DISCONTINUED MEDICATIONS:  Discontinued Medications    No medications on file              (Please note that portions of this note were completed with a voice recognition program.  Efforts were made to edit the dictations but occasionally words are mis-transcribed.)    JOSE DANIEL Skelton (electronically signed)            JOSE DANIEL Seklton  12/19/22 1127

## 2022-12-19 NOTE — DISCHARGE INSTRUCTIONS
Your COVID-19 testing was positive today    Take medication as prescribed and continue to take your Pepcid medication as this can help with overall symptoms. Check your oxygen levels daily using a pulse oximeter which can be purchased any most pharmacies. If you oxygen level is less than 92% and you are feeling shortness of breath or difficulty breathing go to the Emergency department    Go to the ER if you have symptoms or respiratory distress; shortness of breath, difficulty breathing or chest pain. We recommend that you take the following supplements:    · Vitamin D3 5000 IU daily    · Vitamin C 1000mg twice a day. · Zinc 50mg once daily. · NAC 600mg twice a day. · Quercetin 250mg twice daily.     · Melatonin 6mg nightly (causes drowsiness)

## 2022-12-19 NOTE — Clinical Note
Marilynn Pope was seen and treated in our emergency department on 12/19/2022. He may return to work on 12/23/2022. If you have any questions or concerns, please don't hesitate to call.       JOSE DANIEL England

## 2023-12-28 ENCOUNTER — HOSPITAL ENCOUNTER (EMERGENCY)
Age: 23
Discharge: HOME | End: 2023-12-29
Payer: COMMERCIAL

## 2023-12-28 DIAGNOSIS — J20.9 ACUTE BRONCHITIS, UNSPECIFIED ORGANISM: Primary | ICD-10-CM

## 2023-12-28 PROCEDURE — 99284 EMERGENCY DEPT VISIT MOD MDM: CPT

## 2023-12-28 RX ORDER — ACETAMINOPHEN 500 MG
1000 TABLET ORAL ONCE
Status: COMPLETED | OUTPATIENT
Start: 2023-12-29 | End: 2023-12-29

## 2023-12-28 RX ORDER — ONDANSETRON 4 MG/1
4 TABLET, ORALLY DISINTEGRATING ORAL ONCE
Status: COMPLETED | OUTPATIENT
Start: 2023-12-29 | End: 2023-12-29

## 2023-12-28 RX ORDER — IPRATROPIUM BROMIDE AND ALBUTEROL SULFATE 2.5; .5 MG/3ML; MG/3ML
1 SOLUTION RESPIRATORY (INHALATION) ONCE
Status: COMPLETED | OUTPATIENT
Start: 2023-12-29 | End: 2023-12-29

## 2023-12-28 ASSESSMENT — PAIN - FUNCTIONAL ASSESSMENT: PAIN_FUNCTIONAL_ASSESSMENT: 0-10

## 2023-12-28 ASSESSMENT — PAIN DESCRIPTION - LOCATION: LOCATION: THROAT

## 2023-12-28 ASSESSMENT — PAIN SCALES - GENERAL: PAINLEVEL_OUTOF10: 3

## 2023-12-29 ENCOUNTER — APPOINTMENT (OUTPATIENT)
Dept: GENERAL RADIOLOGY | Age: 23
End: 2023-12-29
Payer: COMMERCIAL

## 2023-12-29 VITALS
TEMPERATURE: 98.5 F | DIASTOLIC BLOOD PRESSURE: 74 MMHG | OXYGEN SATURATION: 100 % | HEIGHT: 68 IN | WEIGHT: 180 LBS | SYSTOLIC BLOOD PRESSURE: 136 MMHG | RESPIRATION RATE: 18 BRPM | HEART RATE: 78 BPM | BODY MASS INDEX: 27.28 KG/M2

## 2023-12-29 LAB
FLUAV RNA RESP QL NAA+PROBE: NOT DETECTED
FLUBV RNA RESP QL NAA+PROBE: NOT DETECTED
SARS-COV-2 RNA RESP QL NAA+PROBE: NOT DETECTED

## 2023-12-29 PROCEDURE — 6370000000 HC RX 637 (ALT 250 FOR IP): Performed by: PHYSICIAN ASSISTANT

## 2023-12-29 PROCEDURE — 71046 X-RAY EXAM CHEST 2 VIEWS: CPT

## 2023-12-29 PROCEDURE — 94640 AIRWAY INHALATION TREATMENT: CPT

## 2023-12-29 PROCEDURE — 87636 SARSCOV2 & INF A&B AMP PRB: CPT

## 2023-12-29 PROCEDURE — 94761 N-INVAS EAR/PLS OXIMETRY MLT: CPT

## 2023-12-29 RX ORDER — ALBUTEROL SULFATE 90 UG/1
2 AEROSOL, METERED RESPIRATORY (INHALATION) 4 TIMES DAILY PRN
Qty: 18 G | Refills: 0 | Status: SHIPPED | OUTPATIENT
Start: 2023-12-29

## 2023-12-29 RX ORDER — CODEINE PHOSPHATE/GUAIFENESIN 10-100MG/5
5 LIQUID (ML) ORAL 3 TIMES DAILY PRN
Qty: 45 ML | Refills: 0 | Status: SHIPPED | OUTPATIENT
Start: 2023-12-29 | End: 2024-01-01

## 2023-12-29 RX ORDER — ONDANSETRON 4 MG/1
4 TABLET, FILM COATED ORAL EVERY 8 HOURS PRN
Qty: 20 TABLET | Refills: 0 | Status: SHIPPED | OUTPATIENT
Start: 2023-12-29

## 2023-12-29 RX ADMIN — ACETAMINOPHEN 1000 MG: 500 TABLET ORAL at 00:00

## 2023-12-29 RX ADMIN — IPRATROPIUM BROMIDE AND ALBUTEROL SULFATE 1 DOSE: .5; 3 SOLUTION RESPIRATORY (INHALATION) at 00:25

## 2023-12-29 RX ADMIN — ONDANSETRON 4 MG: 4 TABLET, ORALLY DISINTEGRATING ORAL at 00:00

## 2023-12-29 NOTE — ED NOTES
12/29/23  Contacted pt re: ED visit 12/28/23; Hali Fine to sleep when I got home, will  Rx's later\"; encouraged to return to ED if any problems/concerns.